# Patient Record
Sex: MALE | Race: WHITE
[De-identification: names, ages, dates, MRNs, and addresses within clinical notes are randomized per-mention and may not be internally consistent; named-entity substitution may affect disease eponyms.]

---

## 2017-09-02 ENCOUNTER — HOSPITAL ENCOUNTER (INPATIENT)
Dept: HOSPITAL 76 - ED | Age: 42
LOS: 2 days | Discharge: HOME | DRG: 683 | End: 2017-09-04
Attending: INTERNAL MEDICINE | Admitting: INTERNAL MEDICINE
Payer: COMMERCIAL

## 2017-09-02 DIAGNOSIS — E87.5: ICD-10-CM

## 2017-09-02 DIAGNOSIS — E86.0: ICD-10-CM

## 2017-09-02 DIAGNOSIS — T39.311A: ICD-10-CM

## 2017-09-02 DIAGNOSIS — R79.89: ICD-10-CM

## 2017-09-02 DIAGNOSIS — M62.82: ICD-10-CM

## 2017-09-02 DIAGNOSIS — N17.9: Primary | ICD-10-CM

## 2017-09-02 LAB
ALBUMIN/GLOB SERPL: 1.6 {RATIO} (ref 1–2.2)
ANION GAP SERPL CALCULATED.4IONS-SCNC: 19 MMOL/L (ref 6–13)
BASOPHILS NFR BLD AUTO: 0.2 10^3/UL (ref 0–0.1)
BASOPHILS NFR BLD AUTO: 1.3 %
BILIRUB BLD-MCNC: 1.5 MG/DL (ref 0.2–1)
BUN SERPL-MCNC: 63 MG/DL (ref 6–20)
CALCIUM UR-MCNC: 10.6 MG/DL (ref 8.5–10.3)
CHLORIDE SERPL-SCNC: 91 MMOL/L (ref 101–111)
CO2 SERPL-SCNC: 24 MMOL/L (ref 21–32)
CREAT SERPLBLD-SCNC: 4.7 MG/DL (ref 0.6–1.2)
CUL URINE ADD CHARGE: (no result)
EOSINOPHIL # BLD AUTO: 0 10^3/UL (ref 0–0.7)
EOSINOPHIL NFR BLD AUTO: 0 %
ERYTHROCYTE [DISTWIDTH] IN BLOOD BY AUTOMATED COUNT: 13.2 % (ref 12–15)
GFRSERPLBLD MDRD-ARVRAT: 14 ML/MIN/{1.73_M2} (ref 89–?)
GLOBULIN SER-MCNC: 3.7 G/DL (ref 2.1–4.2)
GLUCOSE SERPL-MCNC: 131 MG/DL (ref 70–100)
HCT VFR BLD AUTO: 50.3 % (ref 42–52)
HGB UR QL STRIP: 17.4 G/DL (ref 14–18)
LIPASE SERPL-CCNC: 30 U/L (ref 22–51)
LYMPHOCYTES # SPEC AUTO: 1 10^3/UL (ref 1.5–3.5)
LYMPHOCYTES NFR BLD AUTO: 5.6 %
MCH RBC QN AUTO: 29.6 PG (ref 27–31)
MCHC RBC AUTO-ENTMCNC: 34.6 G/DL (ref 32–36)
MCV RBC AUTO: 85.7 FL (ref 80–94)
MONOCYTES # BLD AUTO: 0.7 10^3/UL (ref 0–1)
MONOCYTES NFR BLD AUTO: 3.9 %
NEUTROPHILS # BLD AUTO: 16.4 10^3/UL (ref 1.5–6.6)
NEUTROPHILS # SNV AUTO: 18.4 X10^3/UL (ref 4.8–10.8)
NEUTROPHILS NFR BLD AUTO: 89.2 %
NRBC # BLD AUTO: 0 /100WBC
PDW BLD AUTO: 8.6 FL (ref 7.4–11.4)
PH UR STRIP.AUTO: 5.5 PH (ref 5–7.5)
POTASSIUM SERPL-SCNC: 5.3 MMOL/L (ref 3.5–5)
PROT SPEC-MCNC: 9.8 G/DL (ref 6.7–8.2)
RBC MAR: 5.87 10^6/UL (ref 4.7–6.1)
SODIUM SERPLBLD-SCNC: 134 MMOL/L (ref 135–145)
SP GR UR STRIP.AUTO: 1.02 (ref 1–1.03)
UA CHARGE (STRIP ONLY): (no result)
UA W/ MICROSCOPIC CHARGE: YES
UROBILINOGEN UR STRIP.AUTO-MCNC: NEGATIVE MG/DL
WBC # BLD: 18.4 X10^3/UL

## 2017-09-02 PROCEDURE — 83690 ASSAY OF LIPASE: CPT

## 2017-09-02 PROCEDURE — 36415 COLL VENOUS BLD VENIPUNCTURE: CPT

## 2017-09-02 PROCEDURE — 81001 URINALYSIS AUTO W/SCOPE: CPT

## 2017-09-02 PROCEDURE — 81003 URINALYSIS AUTO W/O SCOPE: CPT

## 2017-09-02 PROCEDURE — 80048 BASIC METABOLIC PNL TOTAL CA: CPT

## 2017-09-02 PROCEDURE — 99284 EMERGENCY DEPT VISIT MOD MDM: CPT

## 2017-09-02 PROCEDURE — 87086 URINE CULTURE/COLONY COUNT: CPT

## 2017-09-02 PROCEDURE — 80053 COMPREHEN METABOLIC PANEL: CPT

## 2017-09-02 PROCEDURE — 83735 ASSAY OF MAGNESIUM: CPT

## 2017-09-02 PROCEDURE — 85025 COMPLETE CBC W/AUTO DIFF WBC: CPT

## 2017-09-02 PROCEDURE — 99283 EMERGENCY DEPT VISIT LOW MDM: CPT

## 2017-09-02 PROCEDURE — 96374 THER/PROPH/DIAG INJ IV PUSH: CPT

## 2017-09-02 PROCEDURE — 80329 ANALGESICS NON-OPIOID 1 OR 2: CPT

## 2017-09-02 PROCEDURE — 96361 HYDRATE IV INFUSION ADD-ON: CPT

## 2017-09-02 PROCEDURE — 93005 ELECTROCARDIOGRAM TRACING: CPT

## 2017-09-02 PROCEDURE — 82565 ASSAY OF CREATININE: CPT

## 2017-09-02 PROCEDURE — 84132 ASSAY OF SERUM POTASSIUM: CPT

## 2017-09-02 PROCEDURE — 82550 ASSAY OF CK (CPK): CPT

## 2017-09-02 PROCEDURE — 99285 EMERGENCY DEPT VISIT HI MDM: CPT

## 2017-09-02 RX ADMIN — SODIUM CHLORIDE SCH MLS/HR: 9 INJECTION, SOLUTION INTRAVENOUS at 22:31

## 2017-09-02 RX ADMIN — SODIUM CHLORIDE, PRESERVATIVE FREE SCH ML: 5 INJECTION INTRAVENOUS at 22:31

## 2017-09-02 NOTE — ED PHYSICIAN DOCUMENTATION
History of Present Illness





- Stated complaint


Stated Complaint: N/V/ABD PX





- Chief complaint


Chief Complaint: General





- History obtained from


History obtained from: Patient, Family





- History of Present Illness


Timing: Last night


Pain level max: 7


Pain level now: 0


Improved by: nothing


Worsened by: nothing





- Additonal information


Additional information: 





patient states he ran 50 miles yesterday and 29 miles today. States vomiting 

last night after his run. Feels weak and tired today. 





Review of Systems


Ten Systems: 10 systems reviewed and negative


Constitutional: denies: Fever, Chills, Myalgias


Ears: denies: Ear pain


Nose: denies: Rhinorrhea / runny nose, Congestion


Throat: denies: Sore throat


Cardiac: denies: Chest pain / pressure


Respiratory: denies: Dyspnea, Cough, Hemoptysis, Wheezing


GI: reports: Nausea, Vomiting (last night).  denies: Abdominal Pain, 

Constipation, Diarrhea


: denies: Dysuria, Frequency, Hesitancy


Skin: denies: Rash


Musculoskeletal: denies: Neck pain, Back pain


Neurologic: denies: Focal weakness, Numbness, Confused, Altered mental status, 

Head injury





PD PAST MEDICAL HISTORY





- Past Medical History


Past Medical History: No





- Past Surgical History


Past Surgical History: Yes





- Present Medications


Home Medications: 


 Ambulatory Orders











 Medication  Instructions  Recorded  Confirmed


 


No Known Home Medications [No  09/02/17 09/02/17





Known Home Medications]   














- Allergies


Allergies/Adverse Reactions: 


 Allergies











Allergy/AdvReac Type Severity Reaction Status Date / Time


 


No Known Drug Allergies Allergy   Verified 09/02/17 18:24














- Social History


Does the pt smoke?: No


Smoking Status: Never smoker


Does the pt drink ETOH?: Yes


Does the pt have substance abuse?: No





- Immunizations


Immunizations are current?: Yes





PD ED PE NORMAL





- Vitals


Vital signs reviewed: Yes





- General


General: Alert and oriented X 3, No acute distress, Well developed/nourished





- HEENT


HEENT: PERRL, Moist mucous membranes





- Neck


Neck: Supple, no meningeal sign





- Cardiac


Cardiac: RRR, Strong equal pulses





- Respiratory


Respiratory: No respiratory distress, Clear bilaterally





- Abdomen


Abdomen: Soft, Non tender, Non distended





- Back


Back: No spinal TTP





- Derm


Derm: Warm and dry, No rash





- Extremities


Extremities: No edema, No calf tenderness / cord





- Neuro


Neuro: Alert and oriented X 3





- Psych


Psych: Normal mood, Normal affect





Results





- Vitals


Vitals: 


 Vital Signs - 24 hr











  09/02/17 09/02/17 09/02/17





  18:20 19:03 21:11


 


Temperature 36.6 C  


 


Heart Rate 63 76 66


 


Respiratory 16 16 18





Rate   


 


Blood Pressure 124/81 H 167/84 H 115/53 L


 


O2 Saturation 100 99 96








 Oxygen











O2 Source                      Room air

















- Labs


Labs: 


 Laboratory Tests











  09/02/17 09/02/17 09/02/17





  18:39 18:39 18:39


 


WBC  18.4 H  


 


RBC  5.87  


 


Hgb  17.4  


 


Hct  50.3  


 


MCV  85.7  


 


MCH  29.6  


 


MCHC  34.6  


 


RDW  13.2  


 


Plt Count  212  


 


MPV  8.6  


 


Neut #  16.4 H  


 


Lymph #  1.0 L  


 


Mono #  0.7  


 


Eos #  0.0  


 


Baso #  0.2 H  


 


Absolute Nucleated RBC  0.00  


 


Nucleated RBCs  0.0  


 


Sodium   134 L 


 


Potassium   5.3 H 


 


Chloride   91 L 


 


Carbon Dioxide   24 


 


Anion Gap   19.0 H 


 


BUN   63 H 


 


Creatinine   4.7 H 


 


Estimated GFR (MDRD)   14 L 


 


Glucose   131 H 


 


Calcium   10.6 H 


 


Total Bilirubin   1.5 H 


 


AST   468 H 


 


ALT   94 H 


 


Alkaline Phosphatase   64 


 


Total Creatine Kinase    38754 H*


 


Total Protein   9.8 H 


 


Albumin   6.1 H 


 


Globulin   3.7 


 


Albumin/Globulin Ratio   1.6 


 


Lipase   30 


 


Urine Color   


 


Urine Clarity   


 


Urine pH   


 


Ur Specific Gravity   


 


Urine Protein   


 


Urine Glucose (UA)   


 


Urine Ketones   


 


Urine Occult Blood   


 


Urine Nitrite   


 


Urine Bilirubin   


 


Urine Urobilinogen   


 


Ur Leukocyte Esterase   


 


Urine RBC   


 


Urine WBC   


 


Ur Epithelial Cells   


 


Ur Squamous Epith Cells   


 


Urine Crystals   


 


Urine Bacteria   


 


Urine Casts   


 


Ur Microscopic Review   


 


Urine Culture Comments   














  09/02/17





  20:05


 


WBC 


 


RBC 


 


Hgb 


 


Hct 


 


MCV 


 


MCH 


 


MCHC 


 


RDW 


 


Plt Count 


 


MPV 


 


Neut # 


 


Lymph # 


 


Mono # 


 


Eos # 


 


Baso # 


 


Absolute Nucleated RBC 


 


Nucleated RBCs 


 


Sodium 


 


Potassium 


 


Chloride 


 


Carbon Dioxide 


 


Anion Gap 


 


BUN 


 


Creatinine 


 


Estimated GFR (MDRD) 


 


Glucose 


 


Calcium 


 


Total Bilirubin 


 


AST 


 


ALT 


 


Alkaline Phosphatase 


 


Total Creatine Kinase 


 


Total Protein 


 


Albumin 


 


Globulin 


 


Albumin/Globulin Ratio 


 


Lipase 


 


Urine Color  DARK YELLOW


 


Urine Clarity  CLOUDY


 


Urine pH  5.5


 


Ur Specific Gravity  1.020


 


Urine Protein  100 H


 


Urine Glucose (UA)  NEGATIVE


 


Urine Ketones  TRACE


 


Urine Occult Blood  LARGE H


 


Urine Nitrite  NEGATIVE


 


Urine Bilirubin  NEGATIVE


 


Urine Urobilinogen  0.2 (NORMAL)


 


Ur Leukocyte Esterase  NEGATIVE


 


Urine RBC  0-5


 


Urine WBC  


 


Ur Epithelial Cells  FEW Renal Tubular


 


Ur Squamous Epith Cells  NONE SEEN


 


Urine Crystals  6-10 Calcium Oxalate


 


Urine Bacteria  Few


 


Urine Casts  11-25 Hyaline Casts


 


Ur Microscopic Review  INDICATED


 


Urine Culture Comments  Not Reportable














PD MEDICAL DECISION MAKING





- ED course


Complexity details: reviewed results, re-evaluated patient, considered 

differential, d/w patient, d/w family, d/w consultant


ED course: 





Patient is a 42-year-old male who presents to the emergency department with 

rhabdomyolysis after running 50 miles yesterday and another 29 miles today.  He 

was started on normal saline drip.  Patient was then transitioned to a sodium 

bicarbonate drip to alkalinize the urine. Discussed the case with the 

hospitalist, Dr. WEBB, who accepts.





This document was made in part using voice recognition software. While efforts 

are made to proofread this document, sound alike and grammatical errors may 

occur.





Departure





- Departure


Disposition: [facility] DC/Xfer


Clinical Impression: 


 Dehydration, Elevated LFTs





Acute renal failure


Qualifiers:


 Acute renal failure type: unspecified Qualified Code(s): N17.9 - Acute kidney 

failure, unspecified





Rhabdomyolysis


Qualifiers:


 Rhabdomyolysis type: non-traumatic Qualified Code(s): M62.82 - Rhabdomyolysis


Condition: Stable


Discharge Date/Time: 09/02/17 21:30

## 2017-09-03 LAB
ANION GAP SERPL CALCULATED.4IONS-SCNC: 10 MMOL/L (ref 6–13)
BASOPHILS NFR BLD AUTO: 0.1 10^3/UL (ref 0–0.1)
BASOPHILS NFR BLD AUTO: 0.7 %
BUN SERPL-MCNC: 59 MG/DL (ref 6–20)
CALCIUM UR-MCNC: 8.8 MG/DL (ref 8.5–10.3)
CHLORIDE SERPL-SCNC: 98 MMOL/L (ref 101–111)
CO2 SERPL-SCNC: 29 MMOL/L (ref 21–32)
CREAT SERPLBLD-SCNC: 2.9 MG/DL (ref 0.6–1.2)
CREAT SERPLBLD-SCNC: 3.1 MG/DL (ref 0.6–1.2)
EOSINOPHIL # BLD AUTO: 0 10^3/UL (ref 0–0.7)
EOSINOPHIL NFR BLD AUTO: 0.1 %
ERYTHROCYTE [DISTWIDTH] IN BLOOD BY AUTOMATED COUNT: 13.5 % (ref 12–15)
GFRSERPLBLD MDRD-ARVRAT: 22 ML/MIN/{1.73_M2} (ref 89–?)
GFRSERPLBLD MDRD-ARVRAT: 24 ML/MIN/{1.73_M2} (ref 89–?)
GLUCOSE SERPL-MCNC: 124 MG/DL (ref 70–100)
HCT VFR BLD AUTO: 41.1 % (ref 42–52)
HGB UR QL STRIP: 14 G/DL (ref 14–18)
LYMPHOCYTES # SPEC AUTO: 2 10^3/UL (ref 1.5–3.5)
LYMPHOCYTES NFR BLD AUTO: 15.2 %
MAGNESIUM SERPL-MCNC: 2.6 MG/DL (ref 1.7–2.8)
MCH RBC QN AUTO: 29 PG (ref 27–31)
MCHC RBC AUTO-ENTMCNC: 34 G/DL (ref 32–36)
MCV RBC AUTO: 85.4 FL (ref 80–94)
MONOCYTES # BLD AUTO: 1.1 10^3/UL (ref 0–1)
MONOCYTES NFR BLD AUTO: 8.3 %
NEUTROPHILS # BLD AUTO: 9.9 10^3/UL (ref 1.5–6.6)
NEUTROPHILS # SNV AUTO: 13.1 X10^3/UL (ref 4.8–10.8)
NEUTROPHILS NFR BLD AUTO: 75.7 %
NRBC # BLD AUTO: 0 /100WBC
PDW BLD AUTO: 8.1 FL (ref 7.4–11.4)
PH UR STRIP.AUTO: 5 PH (ref 5–7.5)
POTASSIUM SERPL-SCNC: 4.3 MMOL/L (ref 3.5–5)
POTASSIUM SERPL-SCNC: 4.5 MMOL/L (ref 3.5–5)
RBC MAR: 4.81 10^6/UL (ref 4.7–6.1)
SODIUM SERPLBLD-SCNC: 137 MMOL/L (ref 135–145)
SP GR UR STRIP.AUTO: 1.02 (ref 1–1.03)
UROBILINOGEN UR STRIP.AUTO-MCNC: NEGATIVE MG/DL
WBC # BLD: 13.1 X10^3/UL

## 2017-09-03 RX ADMIN — SODIUM CHLORIDE SCH MLS/HR: 9 INJECTION, SOLUTION INTRAVENOUS at 17:44

## 2017-09-03 RX ADMIN — SODIUM CHLORIDE SCH MLS/HR: 9 INJECTION, SOLUTION INTRAVENOUS at 21:41

## 2017-09-03 RX ADMIN — SODIUM CHLORIDE, PRESERVATIVE FREE SCH: 5 INJECTION INTRAVENOUS at 21:42

## 2017-09-03 RX ADMIN — SODIUM CHLORIDE SCH MLS/HR: 9 INJECTION, SOLUTION INTRAVENOUS at 09:38

## 2017-09-03 RX ADMIN — SODIUM CHLORIDE, PRESERVATIVE FREE SCH: 5 INJECTION INTRAVENOUS at 13:23

## 2017-09-03 RX ADMIN — SODIUM CHLORIDE SCH MLS/HR: 9 INJECTION, SOLUTION INTRAVENOUS at 13:39

## 2017-09-03 RX ADMIN — SODIUM CHLORIDE SCH: 9 INJECTION, SOLUTION INTRAVENOUS at 13:23

## 2017-09-03 RX ADMIN — SODIUM CHLORIDE, PRESERVATIVE FREE SCH: 5 INJECTION INTRAVENOUS at 04:32

## 2017-09-03 NOTE — HISTORY & PHYSICAL EXAMINATION
DATE OF ADMISSION: 09/02/2017

 

HISTORY OF PRESENT ILLNESS: This is a 42-year-old white male with a negative past medical history, on
 no prescription medications. The patient has been a runner/jogger for 12 years and works out 5-7 day
s a week, between 5-20 mile runs each time. When he runs marathons, he usually has 1 day of weakness 
and needs to rehydrate and recover. [place] he ran 50 miles and today 30 miles. Yesterday, toward the end of the entire run, he start
ed to develop nausea with vomiting and sudden diarrhea and near syncope at the end of the race with w
hat he describes as tunnel vision, which improved with simply putting his head down. Even returning h
ome, he was nauseated and he continued to vomit. He could not take any dinner. He was able to eat in 
the middle of the night when he drank water and had a slice of pizza. He awoke and took part in [place] activities but was more tired earlier and had continued abdominal pain, nausea and vomiting. Inter
mittently yesterday and today, he developed calf cramps, which he occasionally does get during marath
ons.

 

When the patient has long runs, he takes between 2 and 10 tablets of Advil per day, 200 mg tablets, 2
 at a time, approximately 2 hours apart, at maximum dosage. Yesterday, he did take 10. Today, he took
 approximately 4 tablets of Advil. At the end of today's portion of the race, he presented to the [facility]
rgency room with nausea and continued vomiting, weakness, tunnel vision once again with near syncope,
 calf cramps, and his wife noted that both evenings, he was more confused and lethargic. In the emerg
ency room, he was found to have a total CPK on blood test of 17,093 and is admitted for rhabdomyolysi
s treatment. Of note also is that his creatinine is 4.7 and he has been starting to get alkalinizatio
n of urine with bicarbonate via an IV drip.

 

MEDICATIONS AT HOME: Only the Advil p.r.n.

 

ALLERGIES: NO KNOWN ALLERGIES.

 

SOCIAL HISTORY: He is a social drinker who has a maximum of 2 glass of wine very rarely. He does not 
smoke and never did. He uses no illicit drugs.

 

FAMILY HISTORY: Positive for cancer of the breast and skin in his mother, and his father had peritoni
tis and also has had cardiac ablation. He has one sister who is healthy. He has a son who is healthy.
 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 He lives with his wife and son.

 

REVIEW OF SYSTEMS: Shows that he has had no fever, no chills, no seizures, no palpitations. The rest 
of review of systems is negative.

 

PHYSICAL EXAMINATION

GENERAL: A white male who is slow in his speech but is otherwise intact and appropriate.

VITAL SIGNS: Blood pressure 124/81. Heart rate is in the 60s and 70s and sinus rhythm. He was  afebri
le.

HEENT: Unremarkable. His mucosa is moist. His eyes are not sunken. Neck shows no JVD at a 30-degree h
eart angle.

NECK: No carotid bruits, no thyromegaly.

CHEST: Clear.

HEART: Sounds are normal. There is no gallop or heave.

ABDOMEN: Soft with decreased bowel sounds, nontender to light palpation. No rebound.

EXTREMITIES: No clubbing, cyanosis or edema. No calf tenderness.

NEUROLOGIC: Intact.

 

LABORATORY DATA: Sodium 134, potassium 5.3, BUN 63, creatinine 4.7, , ALT 94, total CPK 17,093
, albumin 6.1. White blood count 18.4 with a left shift, hemoglobin 17.4, platelet count normal. Ther
e was no PT/INR done. 

 

There was no EKG done. There was no chest x-ray done. 

 

Salicylate level is negligible on blood screen.

 

DIAGNOSES  

1. Rhabdomyolysis.

2. Acute renal failure.

3. Hyperkalemia.

4. Excessive Advil use. 

 

PLAN: IV hydration at a rapid rate, 200-300 mL an hour. After 1 liter of bicarbonate, 150 mEq in a li
ter, that will be changed to all saline hydration. Continue to follow his potassium, creatinine, CPK 
level. Warm or cool compresses p.r.n. The patient was advised that in the future, he needs longer justyna
aks as well as hydration during running. The patient was advised that if kidney function worsens, he 
may need temporary dialysis and, in rare cases, permanent dialysis which would require transfer to a 
facility with higher level of care.

 

 

 

DD:09/02/2017 22:52:00  DT: 09/03/2017 00:17  JOB #: [id number]  EXT JOB #:[id number]

## 2017-09-03 NOTE — PROVIDER PROGRESS NOTE
Assessment/Plan





- Problem List


(1) Acute renal failure


Qualifiers: 


   Acute renal failure type: unspecified   Qualified Code(s): N17.9 - Acute 

kidney failure, unspecified   


Assessment/Plan: 


He is urinating well.


He had Creatinine of 4.3 and now is 3.0


Will continue the Flushing of the kidneys








(2) Rhabdomyolysis


Qualifiers: 


   Rhabdomyolysis type: non-traumatic   Qualified Code(s): M62.82 - 

Rhabdomyolysis   


Assessment/Plan: 


He is sore and had CPK of 17K. will continue the therapy and monitor.








- Current Meds


Current Meds: 





 Current Medications











Generic Name Dose Route Start Last Admin





  Trade Name Freq  PRN Reason Stop Dose Admin


 


Famotidine  20 mg 09/03/17 09:00 09/03/17 09:38





  Pepcid  PO   Not Given





  DAILY MARY ANN   


 


Sodium Chloride  1,000 mls @ 250 mls/hr 09/03/17 10:01 09/03/17 13:39





  Normal Saline 0.9%  IV   250 mls/hr





  .Q4H MARY ANN   Administration


 


Polyethylene Glycol  17 gm 09/03/17 09:00 09/03/17 09:38





  Miralax  PO   Not Given





  DAILY MARY ANN   


 


Sodium Chloride  10 ml 09/02/17 22:00 09/03/17 13:23





  Normal Saline Flush 0.9%  IVP   Not Given





  Q8HR MARY ANN   














- Lab Result


Fish Bone Diagrams: 


 09/03/17 01:00





 09/03/17 01:00





- Additional Planning


My Orders: 





My Active Orders





09/03/17 10:01


Sodium Chloride 0.9% [Normal Saline 0.9%] 1,000 ml  mls/hr 














Subjective





- Subjective


Patient Reports: Feeling Better, Resting Comfortably





Objective


Vital Signs: 





 Vital Signs - 24 hr











  09/02/17 09/02/17 09/03/17





  21:40 23:45 04:00


 


Temperature 37.1 C 37.2 C 36.9 C


 


Heart Rate [ 63 61 60





Brachial]   


 


Respiratory 16 16 16





Rate   


 


Blood Pressure 133/70 H 111/64 112/70





[Right Brachial   





artery]   


 


O2 Saturation 100 97 98














  09/03/17 09/03/17





  08:04 12:49


 


Temperature 36.8 C 36.8 C


 


Heart Rate [ 66 50 L





Brachial]  


 


Respiratory 16 16





Rate  


 


Blood Pressure 113/62 131/79 H





[Right Brachial  





artery]  


 


O2 Saturation 99 99








 Oxygen











O2 Source                      Room air














I&O (Last 24 Hrs): 





 Intake and Output Totals x24h











 09/01/17 09/02/17 09/03/17





 23:59 23:59 23:59


 


Intake Total   4319


 


Output Total  480 700


 


Balance  -480 3619











General: Alert, Oriented x3, Cooperative


HEENT: PERRLA, EOMI


Neck: No JVD, No thyromegaly


Neuro: Alert, Oriented Times 3


Cardiovascular: Regular rate, No murmurs


Respiratory: Chest non-tender, Breath sounds nml


Abdomen: Normal bowel sounds, Soft


Extremities: No clubbing, Normal pulses





- Results


Results: 





 Laboratory Results











WBC  13.1 x10^3/uL (4.8-10.8)  H  09/03/17  01:00    


 


RBC  4.81 10^6/uL (4.70-6.10)   09/03/17  01:00    


 


Hgb  14.0 g/dL (14.0-18.0)   09/03/17  01:00    


 


Hct  41.1 % (42.0-52.0)  L  09/03/17  01:00    


 


MCV  85.4 fL (80.0-94.0)   09/03/17  01:00    


 


MCH  29.0 pg (27.0-31.0)   09/03/17  01:00    


 


MCHC  34.0 g/dL (32.0-36.0)   09/03/17  01:00    


 


RDW  13.5 % (12.0-15.0)   09/03/17  01:00    


 


Plt Count  179 10^3/uL (130-450)   09/03/17  01:00    


 


MPV  8.1 fL (7.4-11.4)   09/03/17  01:00    


 


Neut #  9.9 10^3/uL (1.5-6.6)  H  09/03/17  01:00    


 


Lymph #  2.0 10^3/uL (1.5-3.5)   09/03/17  01:00    


 


Mono #  1.1 10^3/uL (0.0-1.0)  H  09/03/17  01:00    


 


Eos #  0.0 10^3/uL (0.0-0.7)   09/03/17  01:00    


 


Baso #  0.1 10^3/uL (0.0-0.1)   09/03/17  01:00    


 


Absolute Nucleated RBC  0.00 x10^3/uL  09/03/17  01:00    


 


Nucleated RBCs  0.0 /100WBC  09/03/17  01:00    


 


Sodium  137 mmol/L (135-145)   09/03/17  01:00    


 


Potassium  4.3 mmol/L (3.5-5.0)   09/03/17  01:00    


 


Chloride  98 mmol/L (101-111)  L  09/03/17  01:00    


 


Carbon Dioxide  29 mmol/L (21-32)   09/03/17  01:00    


 


Anion Gap  10.0  (6-13)   09/03/17  01:00    


 


BUN  59 mg/dL (6-20)  H  09/03/17  01:00    


 


Creatinine  2.9 mg/dL (0.6-1.2)  H  09/03/17  01:00    


 


Estimated GFR (MDRD)  24  (>89)  L  09/03/17  01:00    


 


Glucose  124 mg/dL ()  H  09/03/17  01:00    


 


Calcium  8.8 mg/dL (8.5-10.3)   09/03/17  01:00    


 


Magnesium  2.6 mg/dL (1.7-2.8)   09/03/17  01:00    


 


Total Bilirubin  1.5 mg/dL (0.2-1.0)  H  09/02/17  18:39    


 


AST  468 IU/L (10-42)  H  09/02/17  18:39    


 


ALT  94 IU/L (10-60)  H  09/02/17  18:39    


 


Alkaline Phosphatase  64 IU/L ()   09/02/17  18:39    


 


Total Creatine Kinase  03069 IU/L ()  H*  09/03/17  09:10    


 


Total Protein  9.8 g/dL (6.7-8.2)  H  09/02/17  18:39    


 


Albumin  6.1 g/dL (3.2-5.5)  H  09/02/17  18:39    


 


Globulin  3.7 g/dL (2.1-4.2)   09/02/17  18:39    


 


Albumin/Globulin Ratio  1.6  (1.0-2.2)   09/02/17  18:39    


 


Lipase  30 U/L (22-51)   09/02/17  18:39    


 


Urine Color  YELLOW   09/03/17  09:20    


 


Urine Clarity  CLEAR  (CLEAR)   09/03/17  09:20    


 


Urine pH  5.0 PH (5.0-7.5)   09/03/17  09:20    


 


Ur Specific Gravity  1.020  (1.002-1.030)   09/03/17  09:20    


 


Urine Protein  NEGATIVE mg/dL (NEGATIVE)   09/03/17  09:20    


 


Urine Glucose (UA)  NEGATIVE mg/dL (NEGATIVE)   09/03/17  09:20    


 


Urine Ketones  NEGATIVE mg/dL (NEGATIVE)   09/03/17  09:20    


 


Urine Occult Blood  MODERATE  (NEGATIVE)  H  09/03/17  09:20    


 


Urine Nitrite  NEGATIVE  (NEGATIVE)   09/03/17  09:20    


 


Urine Bilirubin  NEGATIVE  (NEGATIVE)   09/03/17  09:20    


 


Urine Urobilinogen  0.2 (NORMAL) E.U./dL (NORMAL)   09/03/17  09:20    


 


Ur Leukocyte Esterase  NEGATIVE  (NEGATIVE)   09/03/17  09:20    


 


Urine RBC  0-5 /HPF (0-5)   09/03/17  09:20    


 


Urine WBC   /HPF (0-3)   09/03/17  09:20    


 


Ur Epithelial Cells  FEW Renal Tubular /HPF (<= Few)   09/03/17  09:20    


 


Ur Squamous Epith Cells  FEW Squamous  (<= Few)   09/03/17  09:20    


 


Urine Crystals  6-10 Calcium Oxalate /LPF  09/02/17  20:05    


 


Urine Bacteria  None Seen /HPF (None Seen)   09/03/17  09:20    


 


Urine Casts  11-25 Cellular Casts /LPF  09/03/17  09:20    


 


Ur Microscopic Review  INDICATED   09/02/17  20:05    


 


Urine Culture Comments  Not Reportable   09/02/17  20:05    


 


Salicylates  < 6.0 mg/dL  09/02/17  22:02

## 2017-09-04 VITALS — DIASTOLIC BLOOD PRESSURE: 45 MMHG | SYSTOLIC BLOOD PRESSURE: 91 MMHG

## 2017-09-04 LAB
ALBUMIN/GLOB SERPL: 1.7 {RATIO} (ref 1–2.2)
ANION GAP SERPL CALCULATED.4IONS-SCNC: 4 MMOL/L (ref 6–13)
BASOPHILS NFR BLD AUTO: 0.1 10^3/UL (ref 0–0.1)
BASOPHILS NFR BLD AUTO: 0.8 %
BILIRUB BLD-MCNC: 1 MG/DL (ref 0.2–1)
BUN SERPL-MCNC: 22 MG/DL (ref 6–20)
CALCIUM UR-MCNC: 7.8 MG/DL (ref 8.5–10.3)
CHLORIDE SERPL-SCNC: 109 MMOL/L (ref 101–111)
CO2 SERPL-SCNC: 26 MMOL/L (ref 21–32)
CREAT SERPLBLD-SCNC: 1 MG/DL (ref 0.6–1.2)
EOSINOPHIL # BLD AUTO: 0.1 10^3/UL (ref 0–0.7)
EOSINOPHIL NFR BLD AUTO: 1.7 %
ERYTHROCYTE [DISTWIDTH] IN BLOOD BY AUTOMATED COUNT: 13.5 % (ref 12–15)
GFRSERPLBLD MDRD-ARVRAT: 83 ML/MIN/{1.73_M2} (ref 89–?)
GLOBULIN SER-MCNC: 2 G/DL (ref 2.1–4.2)
GLUCOSE SERPL-MCNC: 100 MG/DL (ref 70–100)
HCT VFR BLD AUTO: 35.8 % (ref 42–52)
HGB UR QL STRIP: 12.2 G/DL (ref 14–18)
LYMPHOCYTES # SPEC AUTO: 2.3 10^3/UL (ref 1.5–3.5)
LYMPHOCYTES NFR BLD AUTO: 38.1 %
MCH RBC QN AUTO: 30.1 PG (ref 27–31)
MCHC RBC AUTO-ENTMCNC: 34.2 G/DL (ref 32–36)
MCV RBC AUTO: 88 FL (ref 80–94)
MONOCYTES # BLD AUTO: 0.5 10^3/UL (ref 0–1)
MONOCYTES NFR BLD AUTO: 7.7 %
NEUTROPHILS # BLD AUTO: 3.1 10^3/UL (ref 1.5–6.6)
NEUTROPHILS # SNV AUTO: 6.1 X10^3/UL (ref 4.8–10.8)
NEUTROPHILS NFR BLD AUTO: 51.7 %
NRBC # BLD AUTO: 0 /100WBC
PDW BLD AUTO: 8.7 FL (ref 7.4–11.4)
POTASSIUM SERPL-SCNC: 4.3 MMOL/L (ref 3.5–5)
PROT SPEC-MCNC: 5.4 G/DL (ref 6.7–8.2)
RBC MAR: 4.07 10^6/UL (ref 4.7–6.1)
SODIUM SERPLBLD-SCNC: 139 MMOL/L (ref 135–145)
WBC # BLD: 6.1 X10^3/UL

## 2017-09-04 RX ADMIN — SODIUM CHLORIDE, PRESERVATIVE FREE SCH: 5 INJECTION INTRAVENOUS at 05:06

## 2017-09-04 RX ADMIN — SODIUM CHLORIDE SCH MLS/HR: 9 INJECTION, SOLUTION INTRAVENOUS at 04:39

## 2017-09-04 RX ADMIN — SODIUM CHLORIDE SCH MLS/HR: 9 INJECTION, SOLUTION INTRAVENOUS at 01:13

## 2017-09-04 NOTE — DISCHARGE SUMMARY
DATE OF ADMISSION: 09/02/2017

 

DATE OF DISCHARGE: 09/04/2017

 

PRIMARY CARE PHYSICIAN: Out of the area. 

 

ADMISSION DIAGNOSES:

1. Rhabdomyolysis.

2. Acute renal failure.

3. Hyperkalemia.

4. Excessive Advil USE. 

 

DISCHARGE DIAGNOSES:

1. Rhabdomyolysis, improved.

2. Acute renal failure with near total improvement.

3. Hyperkalemia, resolved.

4. Excessive Advil use, patient educated.

 

SPECIAL PROCEDURES: None.

 

CONSULTATIONS: None.

 

HOSPITAL COURSE AND MANAGEMENT: The initial presentation, emergency department evaluation, and hospit
ali's plan is well described in the history and physical, see copy of same.

 

SUMMARY: This is a 42-year-old male with no significant past medical history, no prescription medicat
ions. He has been a runner/jogger for 12 years. He works out and runs between 5 and 20 miles. The gray whitley runs marathons and usually has no problems. [place] 
50 miles the first day, 30 miles the second day. He developed nausea, vomiting, diarrhea, near syncop
e at the end of the race. The patient continued to vomit, did not have any dinner and he got abdomina
l pain, calf cramps. He took a large number of Advil 200 mg pills, #10 in the last 24 hours. He becam
e more confused and lethargic. In the emergency department, he had a CK level of 17,000 with a creati
nine of 4.7 and was admitted for IV hydration, monitoring and potential dialysis. 

 

The patient improved over the next day. His CK went down to 10,000 and his creatinine went to 3.0 and
 the day of discharge, his creatinine was down to 1.0, and his CK was 10,300. 

 

PHYSICAL EXAMINATION

VITAL SIGNS: On the day of discharge, 36.8, 47, 107/55, 16, 99 on room air saturation.

EYES: EOM within normal limits, PERRL, nonicteric.

MOUTH AND THROAT: Moist mucous membranes. No other pathology noted in mouth or pharynx.

NECK: Nontender. No lymphadenopathy, no thyromegaly, no JVD.

CHEST WALL: Nontender. Symmetric.

HEART: Normal sinus bradycardia with no murmur, rubs, clicks.

LUNGS: Clear, good air movement bilaterally.

ABDOMEN: Soft, nontender. Normal bowel sounds, no hepatosplenomegaly. 

GENITAL/RECTAL: Exam not done.

NEUROLOGIC: Cognitive intact. Cranial nerves intact. Motor intact. The patient has some soreness of h
is extremities, but has full range of motion, ambulates without difficulty. Cognition intact.

 

LABORATORY DATA: On the day of discharge, his white count is 6.1, 12 and 36 hemoglobin and hematocrit
, platelets 135. Sodium 139, potassium 4.3, chloride 109, CO2 of 26, BUN 22, creatinine is 1.0, kidne
y function, which is 14 is up to 83. The patient's liver enzymes are modestly elevated with an AST of
 240 and ALT of 90, alkaline phosphorus is 39. The patient had an albumin of 3.4 and urine showed mod
erate blood, a few renal tubular cells, few squamous cells and 11-25 cellular casts. 

 

DISCHARGE: He is discharged home. No medications, but avoid NSAIDs for the next week, to slowly retur
n to his usual activity over the next week to 2 weeks and he needs to drink at least 3 quarts of wate
r a day. The patient expressed his understanding and is discharged home. Time spent in discharge acti
vity less than 30 minutes. The patient was examined on the day of discharge.

 

 

 

DD:09/04/2017 08:35:00  DT: 09/04/2017 09:02  JOB #: [id number]  EXT JOB #:[id number]

## 2017-09-04 NOTE — DISCHARGE PLAN
Discharge Plan


Disposition: 01 Home, Self Care


Condition: Good


Diet: Regular


Activity Restrictions: Activity as Tolerated


Shower Restrictions: No


Driving Restrictions: No


Additional Instructions or Follow Up instructions: 


Drink extra fluids, especially water at least 3 quarts, and slowly increase 

your exercise in length and intensity


over the next 3 weeks.


Also avoid NSAIDS during the next week as your kidneys continue to heal.


Get an appt and lab tests in the next 5-7 days with your PCP





Thank you,





Dr Dickson Ta Smoking: If you smoke, Please STOP!  Call [phone] for help.

## 2020-10-28 ENCOUNTER — HOSPITAL ENCOUNTER (OUTPATIENT)
Dept: HOSPITAL 76 - COV | Age: 45
Discharge: HOME | End: 2020-10-28
Attending: FAMILY MEDICINE
Payer: COMMERCIAL

## 2020-10-28 DIAGNOSIS — R50.9: Primary | ICD-10-CM

## 2020-10-28 DIAGNOSIS — R19.7: ICD-10-CM

## 2020-10-28 DIAGNOSIS — Z20.828: ICD-10-CM
